# Patient Record
Sex: MALE | Race: BLACK OR AFRICAN AMERICAN | Employment: UNEMPLOYED | URBAN - METROPOLITAN AREA
[De-identification: names, ages, dates, MRNs, and addresses within clinical notes are randomized per-mention and may not be internally consistent; named-entity substitution may affect disease eponyms.]

---

## 2018-02-08 ENCOUNTER — HOSPITAL ENCOUNTER (EMERGENCY)
Age: 28
Discharge: HOME | End: 2018-02-08
Attending: EMERGENCY MEDICINE
Payer: MEDICARE

## 2018-02-08 ENCOUNTER — APPOINTMENT (OUTPATIENT)
Dept: GENERAL RADIOLOGY | Age: 28
End: 2018-02-08
Attending: NURSE PRACTITIONER
Payer: MEDICARE

## 2018-02-08 VITALS
TEMPERATURE: 97.9 F | SYSTOLIC BLOOD PRESSURE: 115 MMHG | OXYGEN SATURATION: 99 % | HEART RATE: 77 BPM | DIASTOLIC BLOOD PRESSURE: 74 MMHG | WEIGHT: 165 LBS | RESPIRATION RATE: 16 BRPM

## 2018-02-08 DIAGNOSIS — K59.00 CONSTIPATION, UNSPECIFIED CONSTIPATION TYPE: ICD-10-CM

## 2018-02-08 DIAGNOSIS — R10.84 ABDOMINAL PAIN, GENERALIZED: Primary | ICD-10-CM

## 2018-02-08 LAB
APPEARANCE UR: CLEAR
BILIRUB UR QL: NEGATIVE
COLOR UR: YELLOW
GLUCOSE UR STRIP.AUTO-MCNC: NEGATIVE MG/DL
HGB UR QL STRIP: NEGATIVE
KETONES UR QL STRIP.AUTO: ABNORMAL MG/DL
LEUKOCYTE ESTERASE UR QL STRIP.AUTO: NEGATIVE
NITRITE UR QL STRIP.AUTO: NEGATIVE
PH UR STRIP: 5.5 [PH] (ref 5–8)
PROT UR STRIP-MCNC: NEGATIVE MG/DL
SP GR UR REFRACTOMETRY: 1.02 (ref 1–1.03)
UROBILINOGEN UR QL STRIP.AUTO: 1 EU/DL (ref 0.2–1)

## 2018-02-08 PROCEDURE — 74018 RADEX ABDOMEN 1 VIEW: CPT

## 2018-02-08 PROCEDURE — 99283 EMERGENCY DEPT VISIT LOW MDM: CPT

## 2018-02-08 PROCEDURE — 81003 URINALYSIS AUTO W/O SCOPE: CPT | Performed by: EMERGENCY MEDICINE

## 2018-02-08 RX ORDER — DICYCLOMINE HYDROCHLORIDE 20 MG/1
20 TABLET ORAL EVERY 6 HOURS
Qty: 20 TAB | Refills: 0 | Status: SHIPPED | OUTPATIENT
Start: 2018-02-08 | End: 2018-02-13

## 2018-02-08 RX ORDER — LACTULOSE 10 G/15ML
20 SOLUTION ORAL; RECTAL 2 TIMES DAILY
Qty: 300 ML | Refills: 0 | Status: SHIPPED | OUTPATIENT
Start: 2018-02-08 | End: 2018-02-13

## 2018-02-09 NOTE — DISCHARGE INSTRUCTIONS
Abdominal Pain: Care Instructions  Your Care Instructions    Abdominal pain has many possible causes. Some aren't serious and get better on their own in a few days. Others need more testing and treatment. If your pain continues or gets worse, you need to be rechecked and may need more tests to find out what is wrong. You may need surgery to correct the problem. Don't ignore new symptoms, such as fever, nausea and vomiting, urination problems, pain that gets worse, and dizziness. These may be signs of a more serious problem. Your doctor may have recommended a follow-up visit in the next 8 to 12 hours. If you are not getting better, you may need more tests or treatment. The doctor has checked you carefully, but problems can develop later. If you notice any problems or new symptoms, get medical treatment right away. Follow-up care is a key part of your treatment and safety. Be sure to make and go to all appointments, and call your doctor if you are having problems. It's also a good idea to know your test results and keep a list of the medicines you take. How can you care for yourself at home? · Rest until you feel better. · To prevent dehydration, drink plenty of fluids, enough so that your urine is light yellow or clear like water. Choose water and other caffeine-free clear liquids until you feel better. If you have kidney, heart, or liver disease and have to limit fluids, talk with your doctor before you increase the amount of fluids you drink. · If your stomach is upset, eat mild foods, such as rice, dry toast or crackers, bananas, and applesauce. Try eating several small meals instead of two or three large ones. · Wait until 48 hours after all symptoms have gone away before you have spicy foods, alcohol, and drinks that contain caffeine. · Do not eat foods that are high in fat. · Avoid anti-inflammatory medicines such as aspirin, ibuprofen (Advil, Motrin), and naproxen (Aleve).  These can cause stomach upset. Talk to your doctor if you take daily aspirin for another health problem. When should you call for help? Call 911 anytime you think you may need emergency care. For example, call if:  ? · You passed out (lost consciousness). ? · You pass maroon or very bloody stools. ? · You vomit blood or what looks like coffee grounds. ? · You have new, severe belly pain. ?Call your doctor now or seek immediate medical care if:  ? · Your pain gets worse, especially if it becomes focused in one area of your belly. ? · You have a new or higher fever. ? · Your stools are black and look like tar, or they have streaks of blood. ? · You have unexpected vaginal bleeding. ? · You have symptoms of a urinary tract infection. These may include:  ¨ Pain when you urinate. ¨ Urinating more often than usual.  ¨ Blood in your urine. ? · You are dizzy or lightheaded, or you feel like you may faint. ? Watch closely for changes in your health, and be sure to contact your doctor if:  ? · You are not getting better after 1 day (24 hours). Where can you learn more? Go to http://pau-christiana.info/. Enter A448 in the search box to learn more about \"Abdominal Pain: Care Instructions. \"  Current as of: March 20, 2017  Content Version: 11.4  © 3616-6296 [email or web address]. Care instructions adapted under license by [employer] (which disclaims liability or warranty for this information). If you have questions about a medical condition or this instruction, always ask your healthcare professional. [place] [address] any warranty or liability for your use of this information. Constipation: Care Instructions  Your Care Instructions    Constipation means that you have a hard time passing stools (bowel movements). People pass stools from 3 times a day to once every 3 days. What is normal for you may be different.  Constipation may occur with pain in the rectum and cramping. The pain may get worse when you try to pass stools. Sometimes there are small amounts of bright red blood on toilet paper or the surface of stools. This is because of enlarged veins near the rectum (hemorrhoids). A few changes in your diet and lifestyle may help you avoid ongoing constipation. Your doctor may also prescribe medicine to help loosen your stool. Some medicines can cause constipation. These include pain medicines and antidepressants. Tell your doctor about all the medicines you take. Your doctor may want to make a medicine change to ease your symptoms. Follow-up care is a key part of your treatment and safety. Be sure to make and go to all appointments, and call your doctor if you are having problems. It's also a good idea to know your test results and keep a list of the medicines you take. How can you care for yourself at home? · Drink plenty of fluids, enough so that your urine is light yellow or clear like water. If you have kidney, heart, or liver disease and have to limit fluids, talk with your doctor before you increase the amount of fluids you drink. · Include high-fiber foods in your diet each day. These include fruits, vegetables, beans, and whole grains. · Get at least 30 minutes of exercise on most days of the week. Walking is a good choice. You also may want to do other activities, such as running, swimming, cycling, or playing tennis or team sports. · Take a fiber supplement, such as Citrucel or Metamucil, every day. Read and follow all instructions on the label. · Schedule time each day for a bowel movement. A daily routine may help. Take your time having your bowel movement. · Support your feet with a small step stool when you sit on the toilet. This helps flex your hips and places your pelvis in a squatting position. · Your doctor may recommend an over-the-counter laxative to relieve your constipation. Examples are Milk of Magnesia and MiraLax.  Read and follow all instructions on the label. Do not use laxatives on a long-term basis. When should you call for help? Call your doctor now or seek immediate medical care if:  ? · You have new or worse belly pain. ? · You have new or worse nausea or vomiting. ? · You have blood in your stools. ? Watch closely for changes in your health, and be sure to contact your doctor if:  ? · Your constipation is getting worse. ? · You do not get better as expected. Where can you learn more? Go to http://pau-christiana.info/. Enter 21  in the search box to learn more about \"Constipation: Care Instructions. \"  Current as of: March 20, 2017  Content Version: 11.4  © 6888-1140 [employer]wise, [employer]. Care instructions adapted under license by [employer] (which disclaims liability or warranty for this information). If you have questions about a medical condition or this instruction, always ask your healthcare professional. [place] [address] any warranty or liability for your use of this information.

## 2018-02-09 NOTE — ED PROVIDER NOTES
HPI Comments: Pt with a few day hx of on and off generalized pain to abd,  No nausea or vomiting, no fever reported. States did not have a bm yesterday and had a small one today. Patient is a 29 y.o. male presenting with abdominal pain. The history is provided by the patient. No  was used. Abdominal Pain    This is a recurrent problem. The current episode started more than 2 days ago. The problem occurs daily. The problem has not changed since onset. The pain is located in the generalized abdominal region. The pain is at a severity of 1/10. The pain is mild. Associated symptoms include constipation. Pertinent negatives include no fever, no diarrhea, no nausea, no vomiting, no dysuria, no frequency and no back pain. Nothing worsens the pain. The pain is relieved by nothing. Past Medical History:   Diagnosis Date    Asthma        History reviewed. No pertinent surgical history. History reviewed. No pertinent family history. Social History     Social History    Marital status: SINGLE     Spouse name: N/A    Number of children: N/A    Years of education: N/A     Occupational History    Not on file. Social History Main Topics    Smoking status: Never Smoker    Smokeless tobacco: Never Used    Alcohol use Yes    Drug use: Not on file    Sexual activity: Not on file     Other Topics Concern    Not on file     Social History Narrative    No narrative on file         ALLERGIES: Review of patient's allergies indicates no known allergies. Review of Systems   Constitutional: Negative for fever. Gastrointestinal: Positive for abdominal pain and constipation. Negative for diarrhea, nausea and vomiting. Genitourinary: Negative for dysuria and frequency. Musculoskeletal: Negative for back pain. All other systems reviewed and are negative.       Vitals:    02/08/18 2051   BP: 115/74   Pulse: 77   Resp: 16   Temp: 97.9 °F (36.6 °C)   SpO2: 99%   Weight: 74.8 kg (165 lb)            Physical Exam   Constitutional: He is oriented to person, place, and time. He appears well-developed and well-nourished. HENT:   Head: Normocephalic and atraumatic. Eyes: Conjunctivae and EOM are normal. Pupils are equal, round, and reactive to light. Neck: Normal range of motion. Neck supple. Cardiovascular: Normal rate and regular rhythm. Pulmonary/Chest: Effort normal and breath sounds normal.   Abdominal: Soft. Bowel sounds are normal. There is no tenderness. No tenderness with palpation   Musculoskeletal: Normal range of motion. Neurological: He is alert and oriented to person, place, and time. He has normal reflexes. Skin: Skin is warm and dry. Psychiatric: He has a normal mood and affect. His behavior is normal. Judgment and thought content normal.   Nursing note and vitals reviewed. MDM  Number of Diagnoses or Management Options  Abdominal pain, generalized: established and improving  Constipation, unspecified constipation type: established and improving  Diagnosis management comments: Pt improved while in ed states he has no pain and is asking to go home, no fever no vomiting while in ed.          Amount and/or Complexity of Data Reviewed  Tests in the radiology section of CPT®: ordered and reviewed  Review and summarize past medical records: yes  Independent visualization of images, tracings, or specimens: yes    Risk of Complications, Morbidity, and/or Mortality  Presenting problems: moderate  Diagnostic procedures: moderate  Management options: moderate    Patient Progress  Patient progress: improved        ED Course       Procedures            Vitals:  Patient Vitals for the past 12 hrs:   Temp Pulse Resp BP SpO2   02/08/18 2051 97.9 °F (36.6 °C) 77 16 115/74 99 %       Medications ordered:   Medications - No data to display      Lab findings:  Recent Results (from the past 12 hour(s))   URINALYSIS W/ RFLX MICROSCOPIC    Collection Time: 02/08/18  8:52 PM   Result Value Ref Range    Color YELLOW      Appearance CLEAR      Specific gravity 1.024 1.005 - 1.030      pH (UA) 5.5 5.0 - 8.0      Protein NEGATIVE  NEG mg/dL    Glucose NEGATIVE  NEG mg/dL    Ketone TRACE (A) NEG mg/dL    Bilirubin NEGATIVE  NEG      Blood NEGATIVE  NEG      Urobilinogen 1.0 0.2 - 1.0 EU/dL    Nitrites NEGATIVE  NEG      Leukocyte Esterase NEGATIVE  NEG        X-Ray, CT or other radiology findings or impressions:  XR ABD (KUB)    (Results Pending)     No acute findings per my read      Reevaluation of patient:   I have reassessed the patient. Patient is feeling better and is asking to go home    Disposition:    Diagnosis:   1. Abdominal pain, generalized    2. Constipation, unspecified constipation type        Disposition: to Home      Follow-up Information     Follow up With Details Comments [address], [place] In 2 days  [address] [facility] [address] [phone]             Patient's Medications   Start Taking    DICYCLOMINE (BENTYL) 20 MG TABLET    Take 1 Tab by mouth every six (6) hours for 20 doses. LACTULOSE (CHRONULAC) 10 GRAM/15 ML SOLUTION    Take 30 mL by mouth two (2) times a day for 5 days. Continue Taking    No medications on file   These Medications have changed    No medications on file   Stop Taking    No medications on file       Return to the ER if you are unable to obtain referral as directed. Laura Andrews Ewing's  results have been reviewed with him. He has been counseled regarding his diagnosis, treatment, and plan. He verbally conveys understanding and agreement of the signs, symptoms, diagnosis, treatment and prognosis and additionally agrees to follow up as discussed. He also agrees with the care-plan and conveys that all of his questions have been answered.   I have also provided discharge instructions for him that include: educational information regarding their diagnosis and treatment, and list of reasons why they would want to return to the ED prior to their follow-up appointment, should his condition change.         Jeanette COOKC

## 2018-10-21 ENCOUNTER — HOSPITAL ENCOUNTER (EMERGENCY)
Age: 28
Discharge: HOME | End: 2018-10-21
Attending: EMERGENCY MEDICINE
Payer: MEDICAID

## 2018-10-21 ENCOUNTER — APPOINTMENT (OUTPATIENT)
Dept: GENERAL RADIOLOGY | Age: 28
End: 2018-10-21
Attending: PHYSICIAN ASSISTANT
Payer: MEDICAID

## 2018-10-21 VITALS
TEMPERATURE: 98.3 F | WEIGHT: 165 LBS | RESPIRATION RATE: 16 BRPM | SYSTOLIC BLOOD PRESSURE: 117 MMHG | OXYGEN SATURATION: 99 % | DIASTOLIC BLOOD PRESSURE: 75 MMHG

## 2018-10-21 DIAGNOSIS — M25.551 ACUTE RIGHT HIP PAIN: Primary | ICD-10-CM

## 2018-10-21 PROCEDURE — 73502 X-RAY EXAM HIP UNI 2-3 VIEWS: CPT

## 2018-10-21 PROCEDURE — 99283 EMERGENCY DEPT VISIT LOW MDM: CPT

## 2018-10-21 PROCEDURE — 73552 X-RAY EXAM OF FEMUR 2/>: CPT

## 2018-10-21 PROCEDURE — 74011250637 HC RX REV CODE- 250/637: Performed by: PHYSICIAN ASSISTANT

## 2018-10-21 RX ORDER — ACETAMINOPHEN AND CODEINE PHOSPHATE 300; 30 MG/1; MG/1
1 TABLET ORAL
Qty: 20 TAB | Refills: 0 | Status: SHIPPED | OUTPATIENT
Start: 2018-10-21

## 2018-10-21 RX ORDER — HYDROCODONE BITARTRATE AND ACETAMINOPHEN 5; 325 MG/1; MG/1
1 TABLET ORAL
Status: COMPLETED | OUTPATIENT
Start: 2018-10-21 | End: 2018-10-21

## 2018-10-21 RX ADMIN — HYDROCODONE BITARTRATE AND ACETAMINOPHEN 1 TABLET: 5; 325 TABLET ORAL at 11:48

## 2018-10-21 NOTE — DISCHARGE INSTRUCTIONS
Hip Pain: Care Instructions  Your Care Instructions    Hip pain may be caused by many things, including overuse, a fall, or a twisting movement. Another cause of hip pain is arthritis. Your pain may increase when you stand up, walk, or squat. The pain may come and go or may be constant. Home treatment can help relieve hip pain, swelling, and stiffness. If your pain is ongoing, you may need more tests and treatment. Follow-up care is a key part of your treatment and safety. Be sure to make and go to all appointments, and call your doctor if you are having problems. It's also a good idea to know your test results and keep a list of the medicines you take. How can you care for yourself at home? · Take pain medicines exactly as directed. ? If the doctor gave you a prescription medicine for pain, take it as prescribed. ? If you are not taking a prescription pain medicine, ask your doctor if you can take an over-the-counter medicine. · Rest and protect your hip. Take a break from any activity, including standing or walking, that may cause pain. · Put ice or a cold pack against your hip for 10 to 20 minutes at a time. Try to do this every 1 to 2 hours for the next 3 days (when you are awake) or until the swelling goes down. Put a thin cloth between the ice and your skin. · Sleep on your healthy side with a pillow between your knees, or sleep on your back with pillows under your knees. · If there is no swelling, you can put moist heat, a heating pad, or a warm cloth on your hip. Do gentle stretching exercises to help keep your hip flexible. · Learn how to prevent falls. Have your vision and hearing checked regularly. Wear slippers or shoes with a nonskid sole. · Stay at a healthy weight. · Wear comfortable shoes. When should you call for help? Call 911 anytime you think you may need emergency care.  For example, call if:    · You have sudden chest pain and shortness of breath, or you cough up blood.     · You are not able to stand or walk or bear weight.     · Your buttocks, legs, or feet feel numb or tingly.     · Your leg or foot is cool or pale or changes color.     · You have severe pain.    Call your doctor now or seek immediate medical care if:    · You have signs of infection, such as:  ? Increased pain, swelling, warmth, or redness in the hip area. ? Red streaks leading from the hip area. ? Pus draining from the hip area. ? A fever.     · You have signs of a blood clot, such as:  ? Pain in your calf, back of the knee, thigh, or groin. ? Redness and swelling in your leg or groin.     · You are not able to bend, straighten, or move your leg normally.     · You have trouble urinating or having bowel movements.    Watch closely for changes in your health, and be sure to contact your doctor if:    · You do not get better as expected. Where can you learn more? Go to http://pau-christiana.info/. Enter D209 in the search box to learn more about \"Hip Pain: Care Instructions. \"  Current as of: November 20, 2017  Content Version: 11.8  © 5234-6861 [employer]. Care instructions adapted under license by [employer] (which disclaims liability or warranty for this information). If you have questions about a medical condition or this instruction, always ask your healthcare professional. [place] [address] any warranty or liability for your use of this information.

## 2018-10-21 NOTE — ED PROVIDER NOTES
EMERGENCY DEPARTMENT HISTORY AND PHYSICAL EXAM 
 
Date: 10/21/2018 Patient Name: Mabel Tapia History of Presenting Illness Chief Complaint Patient presents with  
 Hip Pain History Provided By: Patient Chief Complaint: right hip pain Duration: 1 week Timing:  Gradual 
Location: right hip Quality: Aching Severity: Moderate Modifying Factors: pain is worse when walking Associated Symptoms: none Additional History (Context): Mabel Tapia is a 29 y.o. male with a history of asthma who presents with a 1 week history of unexplainable hip pain, reports he has no idea what is causing this. Denies truama or injury, or fall. This has never happened before. Denies any heavy lifting. Has tried tylenol at home with mild relief. Pt denies any fevers or chills, headache, dizziness or light headedness, ENT issues, CP or discomfort, SOB, cough, n/v/d/c, abd pain, back pain, diaphoresis, melena/hematochezia, dysuria, hematuria, frequency, focal weakness/numbness/tingling, or rash. Patient has no other complaints at this time. PCP: Sherine Buico MD 
 
Current Facility-Administered Medications Medication Dose Route Frequency Provider Last Rate Last Dose  
 HYDROcodone-acetaminophen (NORCO) 5-325 mg per tablet 1 Tab  1 Tab Oral NOW Rosibel Bolton, [address] Current Outpatient Medications Medication Sig Dispense Refill  acetaminophen-codeine (TYLENOL-CODEINE #3) 300-30 mg per tablet Take 1 Tab by mouth every four (4) hours as needed for Pain. Max Daily Amount: 6 Tabs. 20 Tab 0 Past History Past Medical History: 
Past Medical History:  
Diagnosis Date  Asthma Past Surgical History: No past surgical history on file. Family History: No family history on file. Social History: 
Social History Tobacco Use  Smoking status: Never Smoker  Smokeless tobacco: Never Used Substance Use Topics  Alcohol use: Yes  Drug use: Not on file Allergies: 
No Known Allergies Review of Systems Review of Systems Constitutional: Negative for chills and fever. HENT: Negative for congestion, rhinorrhea and sore throat. Respiratory: Negative for cough and shortness of breath. Cardiovascular: Negative for chest pain. Gastrointestinal: Negative for abdominal pain, blood in stool, constipation, diarrhea, nausea and vomiting. Genitourinary: Negative for dysuria, frequency and hematuria. Musculoskeletal: Positive for arthralgias (hip pain). Negative for back pain and myalgias. Skin: Negative for rash and wound. Neurological: Negative for dizziness and headaches. All other systems reviewed and are negative. All Other Systems Negative Physical Exam  
 
Vitals:  
 10/21/18 0944 BP: 117/75 Resp: 16 Temp: 98.3 °F (36.8 °C) SpO2: 99% Weight: 74.8 kg (165 lb) Physical Exam  
Constitutional: He is oriented to person, place, and time. He appears well-developed and well-nourished. No distress. HENT:  
Head: Normocephalic and atraumatic. Eyes: Conjunctivae are normal.  
Neck: Normal range of motion. Neck supple. Cardiovascular: Normal rate, regular rhythm and normal heart sounds. Pulmonary/Chest: Effort normal and breath sounds normal. No respiratory distress. He exhibits no tenderness. Abdominal: Soft. Bowel sounds are normal. He exhibits no distension. There is no tenderness. There is no rebound and no guarding. Musculoskeletal: Normal range of motion. He exhibits no edema or deformity. Right hip: He exhibits tenderness and bony tenderness. He exhibits normal range of motion, normal strength, no swelling, no crepitus and no deformity. TTP at top of right iliac crest   
Neurological: He is alert and oriented to person, place, and time. Skin: Skin is warm and dry. He is not diaphoretic. Psychiatric: He has a normal mood and affect. Nursing note and vitals reviewed. Diagnostic Study Results Labs - No results found for this or any previous visit (from the past 12 hour(s)). Radiologic Studies -  
XR FEMUR RT 2 VS  
Final Result XR HIP RT W OR WO PELV 2-3 VWS Final Result CT Results  (Last 48 hours) None CXR Results  (Last 48 hours) None Medical Decision Making I am the first provider for this patient. I reviewed the vital signs, available nursing notes, past medical history, past surgical history, family history and social history. Vital Signs-Reviewed the patient's vital signs. Pulse Oximetry Analysis -  100 % RA Records Reviewed: Nursing Notes and Old Medical Records Procedures: None Procedures Provider Notes (Medical Decision Making):  
 
Differential: fracture, dislocation, abrasion, sprain, contusion, laceration, bursitis Plan: Will order dose of pain meds and xray 11:25 AM 
Have shared reassuring xray results with patient, have advised ortho follow up, denies need for crutches, advised tylenol and motrin at home as need and will give pain meds for break through pain. Patient agrees with the plan and management and states all questions have been thoroughly answered and there are no more remaining questions. MED RECONCILIATION: 
Current Facility-Administered Medications Medication Dose Route Frequency  HYDROcodone-acetaminophen (NORCO) 5-325 mg per tablet 1 Tab  1 Tab Oral NOW Current Outpatient Medications Medication Sig  
 acetaminophen-codeine (TYLENOL-CODEINE #3) 300-30 mg per tablet Take 1 Tab by mouth every four (4) hours as needed for Pain. Max Daily Amount: 6 Tabs. Disposition: 
Home DISCHARGE NOTE:  
Pt has been reexamined. Patient has no new complaints, changes, or physical findings. Care plan outlined and precautions discussed. Results of workup were reviewed with the patient. All medications were reviewed with the patient. All of pt's questions and concerns were addressed. Patient was instructed and agrees to follow up with PCP, as well as to return to the ED upon further deterioration. Patient is ready to go home. Follow-up Information Follow up With Specialties Details Why Contact Info [facility] EMERGENCY DEPT Emergency Medicine  As needed [phone] 
[phone] [address] [facility], [address] [facility] - [address] [facility] Surgery In 2 days  [address] [facility], [address] [place] [address] 
[phone] Current Discharge Medication List  
  
START taking these medications Details  
acetaminophen-codeine (TYLENOL-CODEINE #3) 300-30 mg per tablet Take 1 Tab by mouth every four (4) hours as needed for Pain. Max Daily Amount: 6 Tabs. Qty: 20 Tab, Refills: 0 Associated Diagnoses: Acute right hip pain Diagnosis Clinical Impression: 1. Acute right hip pain